# Patient Record
Sex: FEMALE | Race: BLACK OR AFRICAN AMERICAN | NOT HISPANIC OR LATINO | Employment: FULL TIME | URBAN - METROPOLITAN AREA
[De-identification: names, ages, dates, MRNs, and addresses within clinical notes are randomized per-mention and may not be internally consistent; named-entity substitution may affect disease eponyms.]

---

## 2017-10-21 PROBLEM — G89.29 CHRONIC RIGHT-SIDED LOW BACK PAIN: Status: ACTIVE | Noted: 2017-10-21

## 2017-10-21 PROBLEM — M54.50 CHRONIC RIGHT-SIDED LOW BACK PAIN: Status: ACTIVE | Noted: 2017-10-21

## 2019-03-13 ENCOUNTER — HOSPITAL ENCOUNTER (EMERGENCY)
Facility: HOSPITAL | Age: 44
Discharge: HOME | End: 2019-03-13
Attending: EMERGENCY MEDICINE
Payer: MEDICAID

## 2019-03-13 VITALS
TEMPERATURE: 99 F | OXYGEN SATURATION: 100 % | RESPIRATION RATE: 20 BRPM | HEART RATE: 75 BPM | DIASTOLIC BLOOD PRESSURE: 56 MMHG | SYSTOLIC BLOOD PRESSURE: 109 MMHG

## 2019-03-13 DIAGNOSIS — R00.2 PALPITATIONS: Primary | ICD-10-CM

## 2019-03-13 DIAGNOSIS — M54.9 UPPER BACK PAIN: ICD-10-CM

## 2019-03-13 LAB
ALBUMIN SERPL BCP-MCNC: 3.5 G/DL
ALP SERPL-CCNC: 67 U/L
ALT SERPL W/O P-5'-P-CCNC: 7 U/L
ANION GAP SERPL CALC-SCNC: 3 MMOL/L
AST SERPL-CCNC: 11 U/L
BASOPHILS # BLD AUTO: 0.02 K/UL
BASOPHILS NFR BLD: 0.3 %
BILIRUB SERPL-MCNC: 0.5 MG/DL
BUN SERPL-MCNC: 8 MG/DL
CALCIUM SERPL-MCNC: 9 MG/DL
CHLORIDE SERPL-SCNC: 110 MMOL/L
CO2 SERPL-SCNC: 25 MMOL/L
CREAT SERPL-MCNC: 0.8 MG/DL
DIFFERENTIAL METHOD: ABNORMAL
EOSINOPHIL # BLD AUTO: 0.2 K/UL
EOSINOPHIL NFR BLD: 2.3 %
ERYTHROCYTE [DISTWIDTH] IN BLOOD BY AUTOMATED COUNT: 13.7 %
EST. GFR  (AFRICAN AMERICAN): >60 ML/MIN/1.73 M^2
EST. GFR  (NON AFRICAN AMERICAN): >60 ML/MIN/1.73 M^2
GLUCOSE SERPL-MCNC: 95 MG/DL
HCT VFR BLD AUTO: 33.6 %
HGB BLD-MCNC: 10.8 G/DL
LYMPHOCYTES # BLD AUTO: 2.9 K/UL
LYMPHOCYTES NFR BLD: 36.6 %
MAGNESIUM SERPL-MCNC: 1.9 MG/DL
MCH RBC QN AUTO: 29.3 PG
MCHC RBC AUTO-ENTMCNC: 32.1 G/DL
MCV RBC AUTO: 91 FL
MONOCYTES # BLD AUTO: 0.6 K/UL
MONOCYTES NFR BLD: 7.2 %
NEUTROPHILS # BLD AUTO: 4.3 K/UL
NEUTROPHILS NFR BLD: 53.5 %
PLATELET # BLD AUTO: 190 K/UL
PMV BLD AUTO: 10.5 FL
POTASSIUM SERPL-SCNC: 4.5 MMOL/L
PROT SERPL-MCNC: 6.7 G/DL
RBC # BLD AUTO: 3.69 M/UL
SODIUM SERPL-SCNC: 138 MMOL/L
TROPONIN I SERPL DL<=0.01 NG/ML-MCNC: <0.006 NG/ML
TSH SERPL DL<=0.005 MIU/L-ACNC: 0.6 UIU/ML
WBC # BLD AUTO: 7.93 K/UL

## 2019-03-13 PROCEDURE — 63600175 PHARM REV CODE 636 W HCPCS: Performed by: EMERGENCY MEDICINE

## 2019-03-13 PROCEDURE — 83735 ASSAY OF MAGNESIUM: CPT

## 2019-03-13 PROCEDURE — 93005 ELECTROCARDIOGRAM TRACING: CPT

## 2019-03-13 PROCEDURE — 93010 EKG 12-LEAD: ICD-10-PCS | Mod: ,,, | Performed by: STUDENT IN AN ORGANIZED HEALTH CARE EDUCATION/TRAINING PROGRAM

## 2019-03-13 PROCEDURE — 80053 COMPREHEN METABOLIC PANEL: CPT

## 2019-03-13 PROCEDURE — 85025 COMPLETE CBC W/AUTO DIFF WBC: CPT

## 2019-03-13 PROCEDURE — 93010 ELECTROCARDIOGRAM REPORT: CPT | Mod: ,,, | Performed by: STUDENT IN AN ORGANIZED HEALTH CARE EDUCATION/TRAINING PROGRAM

## 2019-03-13 PROCEDURE — 84443 ASSAY THYROID STIM HORMONE: CPT

## 2019-03-13 PROCEDURE — 84484 ASSAY OF TROPONIN QUANT: CPT

## 2019-03-13 PROCEDURE — 96374 THER/PROPH/DIAG INJ IV PUSH: CPT

## 2019-03-13 PROCEDURE — 99285 EMERGENCY DEPT VISIT HI MDM: CPT | Mod: 25

## 2019-03-13 RX ORDER — KETOROLAC TROMETHAMINE 30 MG/ML
30 INJECTION, SOLUTION INTRAMUSCULAR; INTRAVENOUS
Status: COMPLETED | OUTPATIENT
Start: 2019-03-13 | End: 2019-03-13

## 2019-03-13 RX ORDER — IBUPROFEN 600 MG/1
600 TABLET ORAL EVERY 6 HOURS PRN
Qty: 20 TABLET | Refills: 0 | OUTPATIENT
Start: 2019-03-13 | End: 2020-07-30

## 2019-03-13 RX ORDER — METHOCARBAMOL 750 MG/1
1500 TABLET, FILM COATED ORAL 3 TIMES DAILY
Qty: 30 TABLET | Refills: 0 | Status: SHIPPED | OUTPATIENT
Start: 2019-03-13 | End: 2019-03-18

## 2019-03-13 RX ADMIN — KETOROLAC TROMETHAMINE 30 MG: 30 INJECTION, SOLUTION INTRAMUSCULAR at 01:03

## 2019-03-13 NOTE — ED NOTES
Pt. Reports palpitations in her chest x2 days, more constant since yesterday. Pt. Also has headache today to the lower base of her skull with pain radiating down her spine and the left side of her neck. Denies sob, abdominal pain, n/v. She also has occasional numbness in her left fingers for a long time. She is tearful and mildly anxious.

## 2019-03-13 NOTE — ED PROVIDER NOTES
Encounter Date: 3/13/2019    SCRIBE #1 NOTE: I, Jozef Sharpe, am scribing for, and in the presence of,  Dr. العراقي. I have scribed the entire note.       History     Chief Complaint   Patient presents with    Palpitations     x 2 days, with FINLEY       Yaneth Ty is a 43 y.o. female who  has no past medical history on file.    The patient presents to the ED due to intermittent chest pain and palpitations that began two days ago. The patient states her symptoms have been constant since this morning. She reports pain that radiates from her head to her back. She denies shortness of breath, N/V/D, or abdominal pain. She notes she sometimes has numbness in her left hand. She also notes she smokes cigarettes.        The history is provided by the patient.     Review of patient's allergies indicates:  No Known Allergies  History reviewed. No pertinent past medical history.  Past Surgical History:   Procedure Laterality Date    BREAST SURGERY      HYSTERECTOMY      STOMACH SURGERY      TUBAL LIGATION       History reviewed. No pertinent family history.  Social History     Tobacco Use    Smoking status: Never Smoker   Substance Use Topics    Alcohol use: Yes    Drug use: No     Review of Systems   Constitutional: Negative for chills and fever.   HENT: Negative for congestion, rhinorrhea and sore throat.    Eyes: Negative for redness and visual disturbance.   Respiratory: Negative for cough, shortness of breath and wheezing.    Cardiovascular: Positive for chest pain and palpitations.   Gastrointestinal: Negative for abdominal pain, diarrhea, nausea and vomiting.   Genitourinary: Negative for dysuria and hematuria.   Musculoskeletal: Positive for back pain. Negative for myalgias and neck pain.   Skin: Negative for rash.   Neurological: Positive for headaches. Negative for dizziness, weakness and light-headedness.   Psychiatric/Behavioral: Negative for confusion.       Physical Exam     Initial Vitals [03/13/19  1227]   BP Pulse Resp Temp SpO2   (!) 107/57 71 18 98.6 °F (37 °C) 100 %      MAP       --         Physical Exam    Nursing note and vitals reviewed.  Constitutional: She appears well-developed and well-nourished. She is not diaphoretic. She appears distressed.   HENT:   Head: Normocephalic and atraumatic.   Mouth/Throat: No oropharyngeal exudate.   Eyes: Conjunctivae and EOM are normal. Pupils are equal, round, and reactive to light.   Neck: Normal range of motion. Neck supple.   Cardiovascular: Normal rate, regular rhythm and normal heart sounds.   No murmur heard.  Pulmonary/Chest: Breath sounds normal. She has no wheezes. She has no rhonchi. She has no rales.   Abdominal: Soft. Bowel sounds are normal. There is no tenderness. There is no rebound and no guarding.   Musculoskeletal: Normal range of motion. She exhibits no edema.   Tenderness of the left trapezial ridge extending to the left upper thoracic paraspinous muscle and rhomboid muscle.   Lymphadenopathy:     She has no cervical adenopathy.   Neurological: She is alert and oriented to person, place, and time. She has normal strength.   Skin: Skin is warm and dry. Capillary refill takes less than 2 seconds.   Psychiatric: Thought content normal.         ED Course   Procedures  Labs Reviewed   CBC W/ AUTO DIFFERENTIAL - Abnormal; Notable for the following components:       Result Value    RBC 3.69 (*)     Hemoglobin 10.8 (*)     Hematocrit 33.6 (*)     All other components within normal limits   COMPREHENSIVE METABOLIC PANEL - Abnormal; Notable for the following components:    ALT 7 (*)     Anion Gap 3 (*)     All other components within normal limits   TSH   TROPONIN I   MAGNESIUM     EKG Readings: (Independently Interpreted)   Initial Reading: No STEMI. Rhythm: Sinus Arrhythmia. Heart Rate: 63.   No ST elevation. Normal T Waves.       Imaging Results          X-Ray Chest 1 View (Final result)  Result time 03/13/19 14:02:32    Final result by Pramod MILLER  MD Tavo (03/13/19 14:02:32)                 Impression:      No detrimental change or radiographic acute intrathoracic process seen.      Electronically signed by: Pramod Vo MD  Date:    03/13/2019  Time:    14:02             Narrative:    EXAMINATION:  XR CHEST 1 VIEW    CLINICAL HISTORY:  palpitations;    TECHNIQUE:  Single frontal view of the chest was performed.    COMPARISON:  Chest radiograph 10/08/2015    FINDINGS:  Monitoring leads overlie the chest.  No detrimental change.  Cardiomediastinal silhouette is midline and within normal limits.  The lungs are symmetrically well expanded and clear.  Pulmonary vasculature and hilar regions are within normal limits.  No pleural effusion or pneumothorax.  No acute osseous process seen.                                 Medical Decision Making:   Initial Assessment:   Yaneth Ty 43 y.o. presents to the ED today with chest pain. Pt has various risk factors. Will obtain labs, EKG, CXR to further evaluate. Treat symptoms appropriately and reassess.       Differential Diagnosis:   Myocardial ischemia, pericarditis, pulmonary embolus, chest wall pain, pleural inflammation and pulmonary infectious causes.    Clinical Tests:   Lab Tests: Ordered and Reviewed  Radiological Study: Ordered and Reviewed  Medical Tests: Ordered and Reviewed  ED Management:  43-year-old female with palpitations and upper back pain. Patient is extremely tender to palpation along the trapezial ridge and upper back.  Workup here in the ED is unremarkable. I do not suspect cardiac etiology.  This seems to be muscular in origin.  I will place her on ibuprofen and Robaxin and suggested she follow up with the primary physician as soon as able for recheck and further treatment as warranted.                      Clinical Impression:     1. Palpitations    2. Upper back pain               I, Dr. Dequan Ward, personally performed the services described in this documentation. All medical record  entries made by the scribe were at my direction and in my presence. I have reviewed the chart and agree that the record reflects my personal performance and is accurate and complete. Dequan العراقي MD.  3:13 PM 03/13/2019                   Dequan العراقي MD  03/13/19 8105

## 2021-04-09 ENCOUNTER — OFFICE VISIT (OUTPATIENT)
Dept: ORTHOPEDICS | Facility: CLINIC | Age: 46
End: 2021-04-09
Payer: MEDICAID

## 2021-04-09 VITALS
BODY MASS INDEX: 32.95 KG/M2 | RESPIRATION RATE: 18 BRPM | SYSTOLIC BLOOD PRESSURE: 140 MMHG | DIASTOLIC BLOOD PRESSURE: 74 MMHG | WEIGHT: 186 LBS | HEART RATE: 78 BPM

## 2021-04-09 DIAGNOSIS — M25.561 RIGHT KNEE PAIN, UNSPECIFIED CHRONICITY: Primary | ICD-10-CM

## 2021-04-09 PROCEDURE — 99999 PR PBB SHADOW E&M-EST. PATIENT-LVL III: ICD-10-PCS | Mod: PBBFAC,,, | Performed by: PHYSICIAN ASSISTANT

## 2021-04-09 PROCEDURE — 99213 OFFICE O/P EST LOW 20 MIN: CPT | Mod: PBBFAC,PN | Performed by: PHYSICIAN ASSISTANT

## 2021-04-09 PROCEDURE — 99203 OFFICE O/P NEW LOW 30 MIN: CPT | Mod: S$PBB,,, | Performed by: PHYSICIAN ASSISTANT

## 2021-04-09 PROCEDURE — 99203 PR OFFICE/OUTPT VISIT, NEW, LEVL III, 30-44 MIN: ICD-10-PCS | Mod: S$PBB,,, | Performed by: PHYSICIAN ASSISTANT

## 2021-04-09 PROCEDURE — 99999 PR PBB SHADOW E&M-EST. PATIENT-LVL III: CPT | Mod: PBBFAC,,, | Performed by: PHYSICIAN ASSISTANT

## 2021-04-09 RX ORDER — MELOXICAM 15 MG/1
15 TABLET ORAL DAILY
Qty: 30 TABLET | Refills: 2 | Status: SHIPPED | OUTPATIENT
Start: 2021-04-09

## 2021-04-29 PROBLEM — R26.89 POOR BALANCE: Status: ACTIVE | Noted: 2021-04-29

## 2021-04-29 PROBLEM — R29.898 WEAKNESS OF BOTH LOWER EXTREMITIES: Status: ACTIVE | Noted: 2021-04-29

## 2021-05-06 ENCOUNTER — PATIENT MESSAGE (OUTPATIENT)
Dept: RESEARCH | Facility: HOSPITAL | Age: 46
End: 2021-05-06

## 2021-07-01 ENCOUNTER — PATIENT MESSAGE (OUTPATIENT)
Dept: ADMINISTRATIVE | Facility: OTHER | Age: 46
End: 2021-07-01

## 2021-09-25 ENCOUNTER — HOSPITAL ENCOUNTER (EMERGENCY)
Facility: HOSPITAL | Age: 46
Discharge: HOME | End: 2021-09-25
Attending: EMERGENCY MEDICINE
Payer: MEDICAID

## 2021-09-25 VITALS
WEIGHT: 186 LBS | OXYGEN SATURATION: 100 % | RESPIRATION RATE: 18 BRPM | DIASTOLIC BLOOD PRESSURE: 58 MMHG | HEIGHT: 63 IN | BODY MASS INDEX: 32.96 KG/M2 | TEMPERATURE: 99 F | SYSTOLIC BLOOD PRESSURE: 122 MMHG | HEART RATE: 59 BPM

## 2021-09-25 DIAGNOSIS — M54.42 ACUTE BILATERAL LOW BACK PAIN WITH LEFT-SIDED SCIATICA: Primary | ICD-10-CM

## 2021-09-25 LAB
BILIRUB UR QL STRIP: NEGATIVE
CLARITY UR: CLEAR
COLOR UR: NORMAL
CREAT SERPL-MCNC: 0.8 MG/DL (ref 0.5–1.4)
GLUCOSE UR QL STRIP: NEGATIVE
HGB UR QL STRIP: NEGATIVE
KETONES UR QL STRIP: NEGATIVE
LEUKOCYTE ESTERASE UR QL STRIP: NEGATIVE
NITRITE UR QL STRIP: NEGATIVE
PH UR STRIP: 6 [PH] (ref 5–8)
PROT UR QL STRIP: NEGATIVE
SAMPLE: NORMAL
SP GR UR STRIP: 1 (ref 1–1.03)
URN SPEC COLLECT METH UR: NORMAL
UROBILINOGEN UR STRIP-ACNC: NEGATIVE EU/DL

## 2021-09-25 PROCEDURE — 96374 THER/PROPH/DIAG INJ IV PUSH: CPT

## 2021-09-25 PROCEDURE — 99284 EMERGENCY DEPT VISIT MOD MDM: CPT | Mod: 25

## 2021-09-25 PROCEDURE — 81003 URINALYSIS AUTO W/O SCOPE: CPT | Performed by: EMERGENCY MEDICINE

## 2021-09-25 PROCEDURE — 63600175 PHARM REV CODE 636 W HCPCS: Performed by: EMERGENCY MEDICINE

## 2021-09-25 PROCEDURE — 25000003 PHARM REV CODE 250: Performed by: EMERGENCY MEDICINE

## 2021-09-25 RX ORDER — ACETAMINOPHEN 325 MG/1
325 TABLET ORAL EVERY 6 HOURS PRN
COMMUNITY

## 2021-09-25 RX ORDER — ACYCLOVIR 400 MG/1
400 TABLET ORAL 2 TIMES DAILY
COMMUNITY
Start: 2021-06-09

## 2021-09-25 RX ORDER — METHOCARBAMOL 500 MG/1
1000 TABLET, FILM COATED ORAL
Status: COMPLETED | OUTPATIENT
Start: 2021-09-25 | End: 2021-09-25

## 2021-09-25 RX ORDER — KETOROLAC TROMETHAMINE 30 MG/ML
10 INJECTION, SOLUTION INTRAMUSCULAR; INTRAVENOUS
Status: COMPLETED | OUTPATIENT
Start: 2021-09-25 | End: 2021-09-25

## 2021-09-25 RX ORDER — METHOCARBAMOL 750 MG/1
750 TABLET, FILM COATED ORAL 3 TIMES DAILY PRN
Qty: 15 TABLET | Refills: 0 | Status: SHIPPED | OUTPATIENT
Start: 2021-09-25 | End: 2021-09-30

## 2021-09-25 RX ORDER — ZOLPIDEM TARTRATE 10 MG/1
10 TABLET ORAL NIGHTLY
COMMUNITY
Start: 2021-06-24 | End: 2022-12-27

## 2021-09-25 RX ADMIN — KETOROLAC TROMETHAMINE 10 MG: 30 INJECTION, SOLUTION INTRAMUSCULAR at 05:09

## 2021-09-25 RX ADMIN — METHOCARBAMOL 1000 MG: 500 TABLET ORAL at 05:09

## 2021-10-31 ENCOUNTER — HOSPITAL ENCOUNTER (EMERGENCY)
Facility: HOSPITAL | Age: 46
Discharge: HOME | End: 2021-10-31
Attending: EMERGENCY MEDICINE
Payer: MEDICAID

## 2021-10-31 VITALS
WEIGHT: 179 LBS | DIASTOLIC BLOOD PRESSURE: 55 MMHG | HEIGHT: 64 IN | HEART RATE: 53 BPM | TEMPERATURE: 98 F | SYSTOLIC BLOOD PRESSURE: 111 MMHG | OXYGEN SATURATION: 97 % | BODY MASS INDEX: 30.56 KG/M2 | RESPIRATION RATE: 18 BRPM

## 2021-10-31 DIAGNOSIS — M25.559 HIP PAIN: ICD-10-CM

## 2021-10-31 PROCEDURE — 63600175 PHARM REV CODE 636 W HCPCS: Performed by: EMERGENCY MEDICINE

## 2021-10-31 PROCEDURE — 99284 EMERGENCY DEPT VISIT MOD MDM: CPT

## 2021-10-31 PROCEDURE — 96372 THER/PROPH/DIAG INJ SC/IM: CPT

## 2021-10-31 RX ORDER — PREDNISONE 20 MG/1
40 TABLET ORAL DAILY
Qty: 10 TABLET | Refills: 0 | Status: SHIPPED | OUTPATIENT
Start: 2021-10-31 | End: 2021-11-05

## 2021-10-31 RX ORDER — DEXAMETHASONE SODIUM PHOSPHATE 4 MG/ML
8 INJECTION, SOLUTION INTRA-ARTICULAR; INTRALESIONAL; INTRAMUSCULAR; INTRAVENOUS; SOFT TISSUE
Status: COMPLETED | OUTPATIENT
Start: 2021-10-31 | End: 2021-10-31

## 2021-10-31 RX ADMIN — DEXAMETHASONE SODIUM PHOSPHATE 8 MG: 4 INJECTION, SOLUTION INTRA-ARTICULAR; INTRALESIONAL; INTRAMUSCULAR; INTRAVENOUS; SOFT TISSUE at 06:10

## 2022-01-02 ENCOUNTER — CLINICAL SUPPORT (OUTPATIENT)
Dept: FAMILY MEDICINE | Facility: CLINIC | Age: 47
End: 2022-01-02
Payer: MEDICAID

## 2022-01-02 DIAGNOSIS — R05.9 COUGH: Primary | ICD-10-CM

## 2022-01-02 LAB — SARS-COV-2 RDRP RESP QL NAA+PROBE: POSITIVE

## 2022-01-02 PROCEDURE — U0002 COVID-19 LAB TEST NON-CDC: HCPCS | Performed by: FAMILY MEDICINE

## 2022-01-02 NOTE — PROGRESS NOTES
Rapid COVID-19 test collected. Patient tolerated with no issues.  Test will be sent to [facility] lab to process.

## 2022-01-03 ENCOUNTER — PATIENT MESSAGE (OUTPATIENT)
Dept: ADMINISTRATIVE | Facility: OTHER | Age: 47
End: 2022-01-03
Payer: MEDICAID

## 2022-01-04 ENCOUNTER — HOSPITAL ENCOUNTER (EMERGENCY)
Facility: HOSPITAL | Age: 47
Discharge: HOME | End: 2022-01-04
Attending: EMERGENCY MEDICINE
Payer: MEDICAID

## 2022-01-04 VITALS
DIASTOLIC BLOOD PRESSURE: 60 MMHG | TEMPERATURE: 98 F | OXYGEN SATURATION: 97 % | HEIGHT: 64 IN | SYSTOLIC BLOOD PRESSURE: 99 MMHG | HEART RATE: 53 BPM | WEIGHT: 179 LBS | BODY MASS INDEX: 30.56 KG/M2 | RESPIRATION RATE: 17 BRPM

## 2022-01-04 DIAGNOSIS — R09.81 HEAD CONGESTION: Primary | ICD-10-CM

## 2022-01-04 PROCEDURE — 99281 EMR DPT VST MAYX REQ PHY/QHP: CPT

## 2022-01-04 NOTE — ED PROVIDER NOTES
"Encounter Date: 1/4/2022       History     Chief Complaint   Patient presents with    COVID-19 Concerns     Patient reports testing + for COVID on 1/2/22, just wants to be rechecked by a doctor      Presents with no complaints  Pt reports she is COVID positive since testing on 1/2/22  She is here with her Boyfriend who is also positive and he continues to have fever.  She does not but wants to be " rechecked by the doctor"          Review of patient's allergies indicates:  No Known Allergies  No past medical history on file.  Past Surgical History:   Procedure Laterality Date    BREAST SURGERY      HYSTERECTOMY      STOMACH SURGERY      TUBAL LIGATION       No family history on file.  Social History     Tobacco Use    Smoking status: Never Smoker   Substance Use Topics    Alcohol use: Yes    Drug use: No     Review of Systems   Constitutional: Negative for chills and fever.   HENT: Positive for congestion. Negative for sore throat and trouble swallowing.    Respiratory: Negative for cough, shortness of breath and wheezing.    Cardiovascular: Negative for chest pain, palpitations and leg swelling.   Gastrointestinal: Negative for abdominal pain, diarrhea, nausea and vomiting.   Genitourinary: Negative for dysuria.   Musculoskeletal: Negative for back pain.   Skin: Negative for rash.   Neurological: Negative for dizziness, weakness and headaches.       Physical Exam     Initial Vitals [01/04/22 1212]   BP Pulse Resp Temp SpO2   108/70 66 18 98.2 °F (36.8 °C) 99 %      MAP       --         Physical Exam    Constitutional: She appears well-developed and well-nourished.   HENT:   Head: Normocephalic and atraumatic.   Mouth/Throat: Oropharynx is clear and moist.   Eyes: Conjunctivae are normal.   Neck: Neck supple.   Normal range of motion.  Cardiovascular: Normal rate, regular rhythm and normal heart sounds.   Pulmonary/Chest: Breath sounds normal. No respiratory distress. She has no wheezes. She has no rhonchi. " "  Abdominal: Abdomen is soft. Bowel sounds are normal. She exhibits no distension.   Musculoskeletal:         General: Normal range of motion.      Cervical back: Normal range of motion and neck supple.     Neurological: She is alert and oriented to person, place, and time. No sensory deficit. GCS score is 15. GCS eye subscore is 4. GCS verbal subscore is 5. GCS motor subscore is 6.   Skin: Skin is warm and dry. Capillary refill takes less than 2 seconds.   Psychiatric: She has a normal mood and affect. Thought content normal.         ED Course   Procedures  Labs Reviewed - No data to display       Imaging Results    None          Medications - No data to display  Medical Decision Making:   Initial Assessment:   Pt is COvid Positive  Denies any symptoms ie fever SOB or worsening of body aches HA or and diarrhea  She just "wants to be rechecked"    ED Management:  This pt was instructed to use saline nasal spray as directed for congestion as well as a humidifier at night while sleeping   At no time while in the ED did this pt appear in any distress   Have discussed this pt with Dr. Alves                       Clinical Impression:   Final diagnoses:  [R09.81] Head congestion (Primary)          ED Disposition Condition    Discharge Stable        ED Prescriptions     None        Follow-up Information     Follow up With Specialties Details Why Contact Info    Shira Mendoza MD Family Medicine In 3 days  [address] [facility] [id number] [phone]             Kavya Naqvi NP  01/04/22 1851    "

## 2022-01-11 ENCOUNTER — HOSPITAL ENCOUNTER (EMERGENCY)
Facility: HOSPITAL | Age: 47
Discharge: HOME | End: 2022-01-11
Attending: EMERGENCY MEDICINE
Payer: MEDICAID

## 2022-01-11 VITALS
TEMPERATURE: 99 F | SYSTOLIC BLOOD PRESSURE: 110 MMHG | DIASTOLIC BLOOD PRESSURE: 69 MMHG | RESPIRATION RATE: 18 BRPM | HEIGHT: 63 IN | OXYGEN SATURATION: 99 % | WEIGHT: 170 LBS | BODY MASS INDEX: 30.12 KG/M2 | HEART RATE: 77 BPM

## 2022-01-11 DIAGNOSIS — M54.31 BILATERAL SCIATICA: Primary | ICD-10-CM

## 2022-01-11 DIAGNOSIS — M54.32 BILATERAL SCIATICA: Primary | ICD-10-CM

## 2022-01-11 LAB
BILIRUB UR QL STRIP: NEGATIVE
CLARITY UR: CLEAR
COLOR UR: YELLOW
GLUCOSE UR QL STRIP: NEGATIVE
HGB UR QL STRIP: NEGATIVE
KETONES UR QL STRIP: NEGATIVE
LEUKOCYTE ESTERASE UR QL STRIP: NEGATIVE
NITRITE UR QL STRIP: NEGATIVE
PH UR STRIP: 5 [PH] (ref 5–8)
PROT UR QL STRIP: NEGATIVE
SP GR UR STRIP: 1.01 (ref 1–1.03)
URN SPEC COLLECT METH UR: NORMAL
UROBILINOGEN UR STRIP-ACNC: NEGATIVE EU/DL

## 2022-01-11 PROCEDURE — 96372 THER/PROPH/DIAG INJ SC/IM: CPT

## 2022-01-11 PROCEDURE — 99284 EMERGENCY DEPT VISIT MOD MDM: CPT

## 2022-01-11 PROCEDURE — 63600175 PHARM REV CODE 636 W HCPCS: Performed by: STUDENT IN AN ORGANIZED HEALTH CARE EDUCATION/TRAINING PROGRAM

## 2022-01-11 PROCEDURE — 81003 URINALYSIS AUTO W/O SCOPE: CPT | Performed by: STUDENT IN AN ORGANIZED HEALTH CARE EDUCATION/TRAINING PROGRAM

## 2022-01-11 PROCEDURE — 25000003 PHARM REV CODE 250: Performed by: STUDENT IN AN ORGANIZED HEALTH CARE EDUCATION/TRAINING PROGRAM

## 2022-01-11 RX ORDER — KETOROLAC TROMETHAMINE 30 MG/ML
15 INJECTION, SOLUTION INTRAMUSCULAR; INTRAVENOUS
Status: COMPLETED | OUTPATIENT
Start: 2022-01-11 | End: 2022-01-11

## 2022-01-11 RX ORDER — METHOCARBAMOL 500 MG/1
500 TABLET, FILM COATED ORAL
Status: COMPLETED | OUTPATIENT
Start: 2022-01-11 | End: 2022-01-11

## 2022-01-11 RX ORDER — METHOCARBAMOL 500 MG/1
1000 TABLET, FILM COATED ORAL 3 TIMES DAILY
Qty: 18 TABLET | Refills: 0 | Status: SHIPPED | OUTPATIENT
Start: 2022-01-11 | End: 2022-01-14

## 2022-01-11 RX ADMIN — KETOROLAC TROMETHAMINE 15 MG: 30 INJECTION, SOLUTION INTRAMUSCULAR at 06:01

## 2022-01-11 RX ADMIN — METHOCARBAMOL TABLETS 500 MG: 500 TABLET, COATED ORAL at 06:01

## 2022-01-11 NOTE — FIRST PROVIDER EVALUATION
Emergency Department TeleTriage Encounter Note      CHIEF COMPLAINT    Chief Complaint   Patient presents with    Back Pain     THIS AM       VITAL SIGNS   Initial Vitals [01/11/22 1717]   BP Pulse Resp Temp SpO2   105/60 70 19 99 °F (37.2 °C) 100 %      MAP       --            ALLERGIES    Review of patient's allergies indicates:  No Known Allergies    PROVIDER TRIAGE NOTE  This is a teletriage evaluation of a 46 y.o. female presenting to the ED with c/o left lower back pain with radiation down the left leg. Began this morning. Initial orders will be placed and care will be transferred to an alternate provider when patient is roomed for a full evaluation. Any additional orders and the final disposition will be determined by that provider.         ORDERS  Labs Reviewed - No data to display    ED Orders (720h ago, onward)    None            Virtual Visit Note: The provider triage portion of this emergency department evaluation and documentation was performed via [employer], a HIPAA-compliant telemedicine application, in concert with a tele-presenter in the room. A face to face patient evaluation with one of my colleagues will occur once the patient is placed in an emergency department room.      DISCLAIMER: This note was prepared with [employer] voice recognition transcription software. Garbled syntax, mangled pronouns, and other bizarre constructions may be attributed to that software system.

## 2022-01-12 NOTE — ED PROVIDER NOTES
Encounter Date: 1/11/2022       History     Chief Complaint   Patient presents with    Back Pain     THIS AM     46-year-old female with history of chronic back pain presenting to the emergency room for worsening back pain.  Patient states that she has not been formally evaluated by primary care, orthopedic provider, pain management specialist for evaluation of this back pain.  She states that she has been seen in the emergency room multiple times.  Patient describes the pain as bilateral with radiation to the posterior legs, left greater than right.  Pain is exacerbated with flexion of the hips.  Patient has attempted Tylenol for pain control with some relief.  She is in the emergency room today because pain is gotten worse over the last week.  She denies perineal numbness, urinary or bowel retention or incontinence.  She has no abnormal gait.  She is not an IV drug user, has never smoked and has no cardiac history.  She is status post hysterectomy.         Review of patient's allergies indicates:  No Known Allergies  History reviewed. No pertinent past medical history.  Past Surgical History:   Procedure Laterality Date    BREAST SURGERY      HYSTERECTOMY      STOMACH SURGERY      TUBAL LIGATION       No family history on file.  Social History     Tobacco Use    Smoking status: Never Smoker   Substance Use Topics    Alcohol use: Yes    Drug use: No     Review of Systems   Constitutional: Negative for chills, fatigue and fever.   HENT: Negative for congestion, hearing loss, sore throat and trouble swallowing.    Eyes: Negative for visual disturbance.   Respiratory: Negative for cough, chest tightness and shortness of breath.    Cardiovascular: Negative for chest pain.   Gastrointestinal: Negative for abdominal pain and nausea.   Endocrine: Negative for polyuria.   Genitourinary: Negative for difficulty urinating.   Musculoskeletal: Positive for back pain. Negative for arthralgias and myalgias.   Skin:  Negative for rash.   Neurological: Negative for dizziness and headaches.   Psychiatric/Behavioral: The patient is not nervous/anxious.    All other systems reviewed and are negative.      Physical Exam     Initial Vitals [01/11/22 1717]   BP Pulse Resp Temp SpO2   105/60 70 19 99 °F (37.2 °C) 100 %      MAP       --         Physical Exam    Nursing note and vitals reviewed.  Constitutional: She appears well-developed and well-nourished.   HENT:   Head: Normocephalic and atraumatic.   Eyes: EOM are normal. Pupils are equal, round, and reactive to light.   Neck: Neck supple.   Cardiovascular: Normal rate, regular rhythm and normal heart sounds.   Pulmonary/Chest: Breath sounds normal.   Abdominal: Abdomen is soft. There is no abdominal tenderness.   Musculoskeletal:      Cervical back: Neck supple.      Comments: Mild tenderness left greater than right paraspinal.  Positive leg raise with radiation of pain down the posterior legs bilaterally.    Otherwise skin is warm, dry, intact w/o effusions/edema/ erythema/ ecchymosis/ masses/lesions/ obvious deformities/ obvious muscle wasting. Nontender to spinous processes, paraspinal muscles of the cervical/thoracic/lumbar spine. No active spasms. No CVAT. Full AROM of cervical and lumbar spine. Essentially normal gait. The patient is able to ambulate, toe-walk, heel-walk, and tandem gait. NO Obvious leg length discrepancy. Ambulates without assistance or assistive device. Able to transfer to and from the exam table without assistance.     Neurological: She is alert and oriented to person, place, and time. GCS score is 15. GCS eye subscore is 4. GCS verbal subscore is 5. GCS motor subscore is 6.   Skin: Skin is warm and dry. Capillary refill takes less than 2 seconds.   Psychiatric: She has a normal mood and affect. Thought content normal.         ED Course   Procedures  Labs Reviewed   URINALYSIS, REFLEX TO URINE CULTURE    Narrative:     Specimen Source->Urine           Imaging Results    None          Medications   ketorolac injection 15 mg (15 mg Intramuscular Given 1/11/22 1858)   methocarbamoL tablet 500 mg (500 mg Oral Given 1/11/22 1858)     Medical Decision Making:   Initial Assessment:   46-year-old female with history of chronic back pain presenting to the emergency room for worsening back pain.  Patient states that she has not been formally evaluated by primary care, orthopedic provider, pain management specialist for evaluation of this back pain.  She states that she has been seen in the emergency room multiple times.  Patient describes the pain as bilateral with radiation to the posterior legs, left greater than right.  Pain is exacerbated with flexion of the hips.  Patient has attempted Tylenol for pain control with some relief.  She is in the emergency room today because pain is gotten worse over the last week.  She denies perineal numbness, urinary or bowel retention or incontinence.  She has no abnormal gait.  She is not an IV drug user, has never smoked and has no cardiac history.  She is status post hysterectomy.   Differential Diagnosis:   Sciatica versus nephrolithiasis versus pyelonephritis versus urinary tract infection  ED Management:  46-year-old female as above with acute on chronic sciatic.  No indication for plain films given no trauma, UA wholly unremarkable.  No red flag symptoms.  Likely represents acute on chronic flare of sciatica.  Patient has not previously been evaluated, long discussion with patient over need for formal evaluation of chronic low back pain with primary care provider.  Patient understands.  Pain improved significantly with Toradol and Robaxin.  Will discharge home with Robaxin, return precautions and medication precautions given.    Disposition:  Improved  Plan to discharge home with appropriate follow-up, including primary care manager.  Will discharge with prescription for Robaxin.    I discussed the findings and plan of care  with this patient.  All questions were answered to the patient's satisfaction.  Disposition plan as above.  Verbal and written discharge instructions provided to the patient on discharge.  Return precautions discussed prior to discharge.     I discuss this patient case with the cosigning physician, who agrees with diagnosis and plan of care.                      Clinical Impression:   Final diagnoses:  [M54.31, M54.32] Bilateral sciatica (Primary)          ED Disposition Condition    Discharge Stable        ED Prescriptions     Medication Sig Dispense Start Date End Date Auth. Provider    methocarbamoL (ROBAXIN) 500 MG Tab Take 2 tablets (1,000 mg total) by mouth 3 (three) times daily. for 3 days 18 tablet 1/11/2022 1/14/2022 Aaron Piña PA-C        Follow-up Information     Follow up With Specialties Details Why Contact Info Additional Information    Shira Mendoza MD Family Medicine Schedule an appointment as soon as possible for a visit in 3 days  [address] [facility] [address]  [phone]       [facility] - Emergency Dept Emergency Medicine Go to  As needed, If symptoms worsen [address] [facility] [address] [phone] 1st floor           Aaron Piña PA-C  01/11/22 8222

## 2023-06-16 ENCOUNTER — PATIENT MESSAGE (OUTPATIENT)
Dept: PODIATRY | Facility: CLINIC | Age: 48
End: 2023-06-16
Payer: MEDICAID